# Patient Record
Sex: FEMALE | Race: AMERICAN INDIAN OR ALASKA NATIVE | NOT HISPANIC OR LATINO | URBAN - METROPOLITAN AREA
[De-identification: names, ages, dates, MRNs, and addresses within clinical notes are randomized per-mention and may not be internally consistent; named-entity substitution may affect disease eponyms.]

---

## 2017-02-02 ENCOUNTER — EMERGENCY (EMERGENCY)
Facility: HOSPITAL | Age: 32
LOS: 1 days | Discharge: HOME | End: 2017-02-02
Attending: EMERGENCY MEDICINE | Admitting: EMERGENCY MEDICINE
Payer: OTHER MISCELLANEOUS

## 2017-02-02 VITALS
HEART RATE: 73 BPM | DIASTOLIC BLOOD PRESSURE: 70 MMHG | SYSTOLIC BLOOD PRESSURE: 117 MMHG | TEMPERATURE: 97 F | OXYGEN SATURATION: 98 % | RESPIRATION RATE: 17 BRPM

## 2017-02-02 DIAGNOSIS — S83.91XA SPRAIN OF UNSPECIFIED SITE OF RIGHT KNEE, INITIAL ENCOUNTER: ICD-10-CM

## 2017-02-02 DIAGNOSIS — M25.561 PAIN IN RIGHT KNEE: ICD-10-CM

## 2017-02-02 LAB — HCG UR QL: NEGATIVE — SIGNIFICANT CHANGE UP

## 2017-02-02 PROCEDURE — 73560 X-RAY EXAM OF KNEE 1 OR 2: CPT | Mod: 26,RT

## 2017-02-02 PROCEDURE — 99283 EMERGENCY DEPT VISIT LOW MDM: CPT

## 2017-02-02 RX ORDER — IBUPROFEN 200 MG
600 TABLET ORAL ONCE
Qty: 0 | Refills: 0 | Status: COMPLETED | OUTPATIENT
Start: 2017-02-02 | End: 2017-02-02

## 2017-02-02 RX ORDER — IBUPROFEN 200 MG
1 TABLET ORAL
Qty: 21 | Refills: 0 | OUTPATIENT
Start: 2017-02-02 | End: 2017-02-09

## 2017-02-02 RX ORDER — ACETAMINOPHEN 500 MG
650 TABLET ORAL ONCE
Qty: 0 | Refills: 0 | Status: COMPLETED | OUTPATIENT
Start: 2017-02-02 | End: 2017-02-02

## 2017-02-02 RX ORDER — OXYCODONE HYDROCHLORIDE 5 MG/1
1 TABLET ORAL
Qty: 20 | Refills: 0 | OUTPATIENT
Start: 2017-02-02 | End: 2017-02-07

## 2017-02-02 RX ADMIN — Medication 650 MILLIGRAM(S): at 17:42

## 2017-02-02 RX ADMIN — Medication 600 MILLIGRAM(S): at 20:47

## 2017-02-02 NOTE — ED ADULT NURSE NOTE - OBJECTIVE STATEMENT
right hip and new pain x 2 days s/p fall (down stairs), bruising noted, no deformity noted, no s/s of distress, will continue to monitor

## 2017-02-02 NOTE — ED PROVIDER NOTE - PROGRESS NOTE DETAILS
XR negative, stable for dc home XR negative, stable for dc home, NWB for now, crutches, ace wrap, outpatient orthopedic followup

## 2017-02-02 NOTE — ED PROVIDER NOTE - NS ED MD SCRIBE ATTENDING SCRIBE SECTIONS
PAST MEDICAL/SURGICAL/SOCIAL HISTORY/VITAL SIGNS( Pullset)/INTAKE ASSESSMENT/SCREENINGS/REVIEW OF SYSTEMS/HIV/HISTORY OF PRESENT ILLNESS/PHYSICAL EXAM

## 2017-02-02 NOTE — ED PROVIDER NOTE - OBJECTIVE STATEMENT
31y F Pt with no PMHx presents to ED s/p trip and fall 2 days ago. Pt states she fell down stairs and hit her right side. EMS called and Pt felt fine but she woke up this morning with right knee pain and swelling. Pain worsening during walk to work today and Pt states she felt 'pins and needles' in her knee followed by a sudden sharp pain. Pt was unable to bend her knee due to pain. Denies numbness, tingling, and weakness.

## 2017-02-02 NOTE — ED PROVIDER NOTE - MUSCULOSKELETAL, MLM
Decreased ROM to right knee secondary to pain. No swelling, no ecchymosis, no bony tenderness, no deformity. DPI.

## 2017-02-02 NOTE — ED ADULT TRIAGE NOTE - CHIEF COMPLAINT QUOTE
Pt c/o right hip and knee pain s/p fall down stairs at workplace (gym) 2 days ago. +ecchymosis, prior injury quad tear of affected side. Inability to bear weight

## 2017-02-13 ENCOUNTER — EMERGENCY (EMERGENCY)
Facility: HOSPITAL | Age: 32
LOS: 1 days | Discharge: HOME | End: 2017-02-13
Attending: EMERGENCY MEDICINE | Admitting: EMERGENCY MEDICINE
Payer: OTHER MISCELLANEOUS

## 2017-02-13 VITALS
TEMPERATURE: 98 F | HEART RATE: 104 BPM | RESPIRATION RATE: 16 BRPM | OXYGEN SATURATION: 98 % | SYSTOLIC BLOOD PRESSURE: 108 MMHG | DIASTOLIC BLOOD PRESSURE: 72 MMHG

## 2017-02-13 VITALS
DIASTOLIC BLOOD PRESSURE: 65 MMHG | HEART RATE: 81 BPM | TEMPERATURE: 98 F | RESPIRATION RATE: 16 BRPM | OXYGEN SATURATION: 99 % | SYSTOLIC BLOOD PRESSURE: 110 MMHG
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
## 2017-02-13 DIAGNOSIS — M25.561 PAIN IN RIGHT KNEE: ICD-10-CM
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
## 2017-02-13 DIAGNOSIS — Z79.1 LONG TERM (CURRENT) USE OF NON-STEROIDAL ANTI-INFLAMMATORIES (NSAID): ICD-10-CM

## 2017-02-13 DIAGNOSIS — Z79.891 LONG TERM (CURRENT) USE OF OPIATE ANALGESIC: ICD-10-CM

## 2017-02-13 DIAGNOSIS — S83.91XA SPRAIN OF UNSPECIFIED SITE OF RIGHT KNEE, INITIAL ENCOUNTER: ICD-10-CM
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
## 2017-02-13 LAB — HCG UR QL: NEGATIVE — SIGNIFICANT CHANGE UP

## 2017-02-13 PROCEDURE — 73562 X-RAY EXAM OF KNEE 3: CPT | Mod: 26,RT

## 2017-02-13 PROCEDURE — 99284 EMERGENCY DEPT VISIT MOD MDM: CPT

## 2017-02-13 PROCEDURE — 93970 EXTREMITY STUDY: CPT | Mod: 26

## 2017-02-13 RX ORDER — IBUPROFEN 200 MG
1 TABLET ORAL
Qty: 21 | Refills: 0 | OUTPATIENT
Start: 2017-02-13 | End: 2017-02-20

## 2017-02-13 RX ORDER — OXYCODONE HYDROCHLORIDE 5 MG/1
1 TABLET ORAL
Qty: 20 | Refills: 0 | OUTPATIENT
Start: 2017-02-13 | End: 2017-02-18

## 2017-02-13 RX ORDER — IBUPROFEN 200 MG
600 TABLET ORAL ONCE
Qty: 0 | Refills: 0 | Status: COMPLETED | OUTPATIENT
Start: 2017-02-13 | End: 2017-02-13

## 2017-02-13 RX ADMIN — Medication 600 MILLIGRAM(S): at 13:49

## 2017-02-13 NOTE — ED PROVIDER NOTE - NS ED MD SCRIBE ATTENDING SCRIBE SECTIONS
PAST MEDICAL/SURGICAL/SOCIAL HISTORY/DISPOSITION/VITAL SIGNS( Pullset)/HIV/HISTORY OF PRESENT ILLNESS/REVIEW OF SYSTEMS/PHYSICAL EXAM

## 2017-02-13 NOTE — ED PROVIDER NOTE - MEDICAL DECISION MAKING DETAILS
32 y/o F presents w/ R knee pain and swelling w/ worsening stiffness for the past x2 weeks following fall at work. Pt was treated here w/ x-ray showed soft tissue swelling but no fractures. Will give pain meds, x-ray, ultrasound and reassess.

## 2017-02-13 NOTE — ED PROVIDER NOTE - MUSCULOSKELETAL, MLM
Spine appears normal. RLE: swelling +1 nonpitting edema from knee to ankle, + crepitus in super patellar, versa pain w/ rom at dpi and vi

## 2017-02-13 NOTE — ED PROVIDER NOTE - PROGRESS NOTE DETAILS
improved, ambulatory with crutches,  cont NWB for now, again recc orthopedic follow up, XR negative, US negative for DVT.

## 2017-02-13 NOTE — ED ADULT TRIAGE NOTE - CHIEF COMPLAINT QUOTE
Pt c/o pain to R knee. States was seen in ED 2/2 for fall and knee injury. Still having pain to area.

## 2017-02-13 NOTE — ED PROVIDER NOTE - OBJECTIVE STATEMENT
32 y/o F with a PMHx of R quad tendon rupture (treated w/ ibuprofen and Epson salt) presents to the ED w/ persisting R knee pain and swelling w/ worsening stiffness. Pt states she was treated here after she fell at work 02/02/17, x-rays performed showed soft tissue swelling w/ no fracture. Was given ace bandages, crutches, pain meds, and was instructed to f/u w/ ortho if pain persisted but hasn't since she has been feeling "fine" until she started walking on it x2 days ago at work. Pain started today after walking to work. Denies n/v, fever, chills, numbness, tingling, back pain.

## 2019-04-03 NOTE — ED PROVIDER NOTE - NSCAREINITIATED _GEN_ER
Payton Lubin(Attending) No pertinent past medical history <<----- Click to add NO pertinent Past Medical History

## 2019-07-10 NOTE — ED ADULT TRIAGE NOTE - AS TEMP SITE
No idea- if recvd it was completed and there should be notations in the phone messages or in my note when I saw the pt... Nothing in my box and it does not ring a bell   temporal

## 2020-06-18 NOTE — ED ADULT NURSE NOTE - CAS EDP DISCH TYPE
Subjective:     Chief Complaint   Patient presents with   • Follow-Up       HPI:   Nila presents today to discuss the following.     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  The patient has a hx of abnormal back in 10/19  The patient had high risk HPV for the second time. Colposcopy indicated.  Hasnt connected with GYN due to covid-19    Hypertension  Stable. Monitoring BP at home. Currently taking metoprolol 25mg daily as directed. Denies lightheadedness, vision changes, headache, palpitations or leg swelling.  BP in the office today is 138/80      Dyslipidemia  This is a chronic problem  The patient has HLD and has been on atorvastatin 20mg daily.   Cholesterol values have been well controlled with this regimen   Last lipid panel 10/19      Acute pain of right knee  This is a new problem  The patient began to develop right knee pain extending down her leg. She feels like her right foot is burning.   No trauma  Overall, it is getting better  She has been taking TENS unit which helps and ice  Also takes APAP.  Patient mentions that her knee was initially swollen down her extremity but this is doing much better now.  Denies any shortness of breath or chest pain.      Past Medical History:   Diagnosis Date   • Hyperlipidemia    • Hypertension        Social History     Tobacco Use   • Smoking status: Never Smoker   • Smokeless tobacco: Never Used   Substance Use Topics   • Alcohol use: Not Currently     Frequency: Monthly or less     Drinks per session: 1 or 2     Binge frequency: Never     Comment: 1 drink per month    • Drug use: No       Current Outpatient Medications Ordered in Epic   Medication Sig Dispense Refill   • Phenazopyridine HCl (AZO TABS PO) Take  by mouth.     • metoprolol SR (TOPROL XL) 25 MG TABLET SR 24 HR Take 1 Tab by mouth every day. 90 Tab 3   • atorvastatin (LIPITOR) 20 MG Tab Take 1 Tab by mouth every evening. 90 Tab 3     No current Epic-ordered  "facility-administered medications on file.        Allergies:  Penicillins    Health Maintenance: Completed    ROS:  Gen: no fevers/chills, no changes in weight  Eyes: no changes in vision  ENT: no sore throat, no hearing loss, no bloody nose  Pulm: no sob, no cough  CV: no chest pain, no palpitations    Objective:     Exam:  /80 (BP Location: Left arm, Patient Position: Sitting, BP Cuff Size: Adult)   Pulse 90   Temp 36.9 °C (98.5 °F) (Temporal)   Resp 16   Ht 1.6 m (5' 3\")   Wt 84.3 kg (185 lb 12.8 oz)   LMP  (LMP Unknown)   SpO2 97%   BMI 32.91 kg/m²  Body mass index is 32.91 kg/m².    Constitutional: Alert, no distress, well-groomed.  Skin: Warm, dry, good turgor, no rashes in visible areas.  Eye: Equal, round and reactive, conjunctiva clear, lids normal.  ENMT: Lips without lesions, good dentition, moist mucous membranes.  Neck: Trachea midline, no masses, no thyromegaly.  Respiratory: Unlabored respiratory effort, no cough.  MSK: Normal gait, moves all extremities.  Inspection of her right knee shows point tenderness behind her inferior right knee.  There is no swelling at the calf.  Questionable positive Homans sign of the right lower extremity.  Neuro: Grossly non-focal.   Psych: Alert and oriented x3, normal affect and mood.      Assessment & Plan:     65 y.o. female with the following -     1. Cervical high risk HPV (human papillomavirus) test positive  This is a chronic condition.  The patient still has not been able to connect with gynecology for colposcopy due to COVID-19.  I have reinforced the need for her to connect with them.    2. Essential hypertension  This is a chronic condition.  Blood pressure stable with current regimen.  -Continue current regimen    3. Dyslipidemia  This is a chronic condition.  The patient is currently on statin therapy.  Cholesterol panel will be October 2020.    4. Acute pain of right knee  This is a new problem.  The patient has been experiencing acute right " knee pain radiating down her leg with burning sensation to her foot.  Etiology is not entirely clear.  There is no obvious swelling.  However, I would like to rule out DVT as the pain radiates on her calf and the patient mentions that it was initially swollen.  I have placed stat ultrasound orders today.  The patient will call today to schedule this.  I will also order a right knee x-ray to evaluate for osteoarthritis and a uric acid to rule out gout.  Return precautions given today.  Emergency room precautions given today.  - DX-KNEE 3 VIEWS RIGHT; Future  - URIC ACID; Future  - US-VEIN MAPPING LOWER EXTREMITY UNILAT RIGHT; Future      Return in about 6 months (around 12/18/2020) for FU conditions .    Please note that this dictation was created using voice recognition software. I have made every reasonable attempt to correct obvious errors, but I expect that there are errors of grammar and possibly content that I did not discover before finalizing the note.       Home

## 2021-02-27 NOTE — ED PROVIDER NOTE - ATTESTATION, MLM
no I have reviewed and confirmed nurses' notes for patient's medications, allergies, medical history, and surgical history.